# Patient Record
Sex: MALE | Race: WHITE | NOT HISPANIC OR LATINO | Employment: FULL TIME | URBAN - METROPOLITAN AREA
[De-identification: names, ages, dates, MRNs, and addresses within clinical notes are randomized per-mention and may not be internally consistent; named-entity substitution may affect disease eponyms.]

---

## 2021-11-23 ENCOUNTER — TELEPHONE (OUTPATIENT)
Dept: SCHEDULING | Facility: IMAGING CENTER | Age: 46
End: 2021-11-23

## 2021-12-28 ENCOUNTER — OFFICE VISIT (OUTPATIENT)
Dept: MEDICAL GROUP | Facility: MEDICAL CENTER | Age: 46
End: 2021-12-28
Payer: COMMERCIAL

## 2021-12-28 VITALS
HEIGHT: 72 IN | BODY MASS INDEX: 31.15 KG/M2 | RESPIRATION RATE: 12 BRPM | DIASTOLIC BLOOD PRESSURE: 70 MMHG | TEMPERATURE: 96.7 F | WEIGHT: 230 LBS | OXYGEN SATURATION: 95 % | HEART RATE: 92 BPM | SYSTOLIC BLOOD PRESSURE: 110 MMHG

## 2021-12-28 DIAGNOSIS — Z12.11 COLON CANCER SCREENING: ICD-10-CM

## 2021-12-28 DIAGNOSIS — R20.0 FINGER NUMBNESS: ICD-10-CM

## 2021-12-28 DIAGNOSIS — Z23 NEED FOR VACCINATION: ICD-10-CM

## 2021-12-28 DIAGNOSIS — M54.9 CHRONIC BACK PAIN, UNSPECIFIED BACK LOCATION, UNSPECIFIED BACK PAIN LATERALITY: ICD-10-CM

## 2021-12-28 DIAGNOSIS — G89.29 CHRONIC BACK PAIN, UNSPECIFIED BACK LOCATION, UNSPECIFIED BACK PAIN LATERALITY: ICD-10-CM

## 2021-12-28 DIAGNOSIS — Z00.00 HEALTHCARE MAINTENANCE: ICD-10-CM

## 2021-12-28 PROCEDURE — 99386 PREV VISIT NEW AGE 40-64: CPT | Mod: 25 | Performed by: FAMILY MEDICINE

## 2021-12-28 PROCEDURE — 90472 IMMUNIZATION ADMIN EACH ADD: CPT | Performed by: FAMILY MEDICINE

## 2021-12-28 PROCEDURE — 90471 IMMUNIZATION ADMIN: CPT | Performed by: FAMILY MEDICINE

## 2021-12-28 PROCEDURE — 90715 TDAP VACCINE 7 YRS/> IM: CPT | Performed by: FAMILY MEDICINE

## 2021-12-28 PROCEDURE — 90686 IIV4 VACC NO PRSV 0.5 ML IM: CPT | Performed by: FAMILY MEDICINE

## 2021-12-28 RX ORDER — ASPIRIN 81 MG/1
81 TABLET, CHEWABLE ORAL DAILY
COMMUNITY
End: 2022-02-09

## 2021-12-28 ASSESSMENT — PATIENT HEALTH QUESTIONNAIRE - PHQ9: CLINICAL INTERPRETATION OF PHQ2 SCORE: 0

## 2021-12-28 NOTE — ASSESSMENT & PLAN NOTE
Lipids: ordered.  Fasting Glucose: ordered.  Colonoscopy: declines, assents to FIT.   PSA: ordered.    Flu vaccine: given 12/28/21.   Tdap: given 12/28/21.   COVID vaccine: reportedly received.

## 2021-12-28 NOTE — PROGRESS NOTES
[facility]  Progress Note  New Patient    Subjective:   Avery Jhaveri is a 46 y.o. male here today for a wellness visit. This is a new patient to me. The patient comes in alone.     Healthcare maintenance  Lipids: ordered.  Fasting Glucose: ordered.  Colonoscopy: declines, assents to FIT.   PSA: ordered.    Flu vaccine: given 12/28/21.   Tdap: given 12/28/21.   COVID vaccine: reportedly received.    Back pain  Two years ago the patient slipped and injured his back.  He was told that he has vertebral compression fractures.  He currently treats these with ibuprofen 1 tablet twice daily.  He sees a chiropractor.  He does describe some finger numbness, discussed elsewhere.  He recently moved here and would be interested in establishing with a physiatrist here.    Finger numbness  Patient describes a few years of intermittent numbness in the first 3 digits of the bilateral hands.      Current Outpatient Medications on File Prior to Visit   Medication Sig Dispense Refill   • aspirin (ASA) 81 MG Chew Tab chewable tablet Chew 81 mg every day.       No current facility-administered medications on file prior to visit.       Past Medical History:   Diagnosis Date   • Back pain        Allergies: Patient has no known allergies.    Surgical History:  has a past surgical history that includes ankle total (Left, 1995).    Family History: family history includes Heart Disease in his father.    Social History:  reports that he has never smoked. He has never used smokeless tobacco. He reports current alcohol use. He reports that he does not use drugs.    ROS:   No CP or SOB.        Objective:     Vitals:    12/28/21 0754   BP: 110/70   BP Location: Left arm   Patient Position: Sitting   BP Cuff Size: Adult   Pulse: 92   Resp: 12   Temp: 35.9 °C (96.7 °F)   TempSrc: Temporal   SpO2: 95%   Weight: 104 kg (230 lb)   Height: 1.829 m (6')       Physical Exam:  Constitutional: Alert, no distress.  Skin: Warm, dry, good turgor, no rashes  in visible areas.  Eye: Equal, round and reactive, conjunctiva clear, lids normal.  Respiratory: Unlabored respiratory effort, lungs clear to auscultation, no wheezes, no ronchi.  Cardiovascular: Normal S1, S2, no murmur, no edema.  Ext: Negative Spurling's testing bilaterally.  Sensation intact in all dermatomal distributions the bilateral upper extremities.  Radial pulses 2+ bilaterally.  Strength 5 out of 5 on bilateral handgrip.      Assessment and Plan:     1. Need for vaccination  - Tdap Vaccine =>6YO IM  - INFLUENZA VACCINE QUAD INJ (PF)    2. Healthcare maintenance  - see HPI.   -Age-appropriate anticipatory guidance discussed including diet and exercise.  Recommended 3 fruits, 4 vegetables and half a gallon of water each day.  Recommended 30 minutes of exercise each day.  -Immunizations given today.   - CBC WITH DIFFERENTIAL; Future  - Comp Metabolic Panel; Future  - Lipid Profile; Future  - PROSTATE SPECIFIC AG SCREENING; Future  - OCCULT BLOOD FECES IMMUNOASSAY; Future    3. Chronic back pain, unspecified back location, unspecified back pain laterality  - Referral to Pain Management - Chronic Opioid Therapy    4. Finger numbness  I suspect this is due to carpal tunnel syndrome and recommended a nighttime wrist splint, however, with his history of back injury I think a physiatry consultation is warranted.  - Referral to Pain Management - Chronic Opioid Therapy    5. Colon cancer screening  - OCCULT BLOOD FECES IMMUNOASSAY; Future        Followup: Return in about 1 year (around 12/28/2022), or if symptoms worsen or fail to improve, for Wellness Visit, Long.

## 2021-12-28 NOTE — LETTER
[facility]  Timothy Butterfield M.D.  [address]  [place] [address]  Fax: [phone]   Authorization for Release/Disclosure of   Protected Health Information   Name: BIN AN : 1975 SSN: [id number]   Address: [address] [place] [address] [place] [address] Phone:    [phone] (home)    I authorize the entity listed below to release/disclose the PHI below to:   [facility]/Timothy Butterfield M.D. and Timothy Butterfield M.D.   Provider or Entity Name:     Address   City, State, Zip   Phone:      Fax:     Reason for request: continuity of care   Information to be released:    [  ] LAST COLONOSCOPY,  including any PATH REPORT and follow-up  [  ] LAST FIT/COLOGUARD RESULT [  ] LAST DEXA  [  ] LAST MAMMOGRAM  [  ] LAST PAP  [  ] LAST LABS [  ] RETINA EXAM REPORT  [  ] IMMUNIZATION RECORDS  [  ] Release all info      [  ] Check here and initial the line next to each item to release ALL health information INCLUDING  _____ Care and treatment for drug and / or alcohol abuse  _____ HIV testing, infection status, or AIDS  _____ Genetic Testing    DATES OF SERVICE OR TIME PERIOD TO BE DISCLOSED: _____________  I understand and acknowledge that:  * This Authorization may be revoked at any time by you in writing, except if your health information has already been used or disclosed.  * Your health information that will be used or disclosed as a result of you signing this authorization could be re-disclosed by the recipient. If this occurs, your re-disclosed health information may no longer be protected by State or Federal laws.  * You may refuse to sign this Authorization. Your refusal will not affect your ability to obtain treatment.  * This Authorization becomes effective upon signing and will  on (date) __________.      If no date is indicated, this Authorization will  one (1) year from the signature date.    Name: Avery Jhaveri    Signature:   Date:     2021       PLEASE FAX REQUESTED RECORDS BACK TO:  [phone]

## 2021-12-28 NOTE — ASSESSMENT & PLAN NOTE
Two years ago the patient slipped and injured his back.  He was told that he has vertebral compression fractures.  He currently treats these with ibuprofen 1 tablet twice daily.  He sees a chiropractor.  He does describe some finger numbness, discussed elsewhere.  He recently moved here and would be interested in establishing with a physiatrist here.

## 2021-12-28 NOTE — ASSESSMENT & PLAN NOTE
Patient describes a few years of intermittent numbness in the first 3 digits of the bilateral hands.

## 2022-01-12 ENCOUNTER — HOSPITAL ENCOUNTER (OUTPATIENT)
Dept: LAB | Facility: MEDICAL CENTER | Age: 47
End: 2022-01-12
Attending: FAMILY MEDICINE
Payer: COMMERCIAL

## 2022-01-12 DIAGNOSIS — Z00.00 HEALTHCARE MAINTENANCE: ICD-10-CM

## 2022-01-12 LAB
ALBUMIN SERPL BCP-MCNC: 4.5 G/DL (ref 3.2–4.9)
ALBUMIN/GLOB SERPL: 1.5 G/DL
ALP SERPL-CCNC: 61 U/L (ref 30–99)
ALT SERPL-CCNC: 16 U/L (ref 2–50)
ANION GAP SERPL CALC-SCNC: 11 MMOL/L (ref 7–16)
AST SERPL-CCNC: 17 U/L (ref 12–45)
BASOPHILS # BLD AUTO: 0.8 % (ref 0–1.8)
BASOPHILS # BLD: 0.04 K/UL (ref 0–0.12)
BILIRUB SERPL-MCNC: 0.4 MG/DL (ref 0.1–1.5)
BUN SERPL-MCNC: 19 MG/DL (ref 8–22)
CALCIUM SERPL-MCNC: 9.3 MG/DL (ref 8.5–10.5)
CHLORIDE SERPL-SCNC: 103 MMOL/L (ref 96–112)
CHOLEST SERPL-MCNC: 183 MG/DL (ref 100–199)
CO2 SERPL-SCNC: 24 MMOL/L (ref 20–33)
CREAT SERPL-MCNC: 0.85 MG/DL (ref 0.5–1.4)
EOSINOPHIL # BLD AUTO: 0.15 K/UL (ref 0–0.51)
EOSINOPHIL NFR BLD: 3.1 % (ref 0–6.9)
ERYTHROCYTE [DISTWIDTH] IN BLOOD BY AUTOMATED COUNT: 41.6 FL (ref 35.9–50)
FASTING STATUS PATIENT QL REPORTED: NORMAL
GLOBULIN SER CALC-MCNC: 3.1 G/DL (ref 1.9–3.5)
GLUCOSE SERPL-MCNC: 93 MG/DL (ref 65–99)
HCT VFR BLD AUTO: 47.9 % (ref 42–52)
HDLC SERPL-MCNC: 39 MG/DL
HGB BLD-MCNC: 16.5 G/DL (ref 14–18)
IMM GRANULOCYTES # BLD AUTO: 0.03 K/UL (ref 0–0.11)
IMM GRANULOCYTES NFR BLD AUTO: 0.6 % (ref 0–0.9)
LDLC SERPL CALC-MCNC: 104 MG/DL
LYMPHOCYTES # BLD AUTO: 1.93 K/UL (ref 1–4.8)
LYMPHOCYTES NFR BLD: 39.3 % (ref 22–41)
MCH RBC QN AUTO: 31.1 PG (ref 27–33)
MCHC RBC AUTO-ENTMCNC: 34.4 G/DL (ref 33.7–35.3)
MCV RBC AUTO: 90.4 FL (ref 81.4–97.8)
MONOCYTES # BLD AUTO: 0.32 K/UL (ref 0–0.85)
MONOCYTES NFR BLD AUTO: 6.5 % (ref 0–13.4)
NEUTROPHILS # BLD AUTO: 2.44 K/UL (ref 1.82–7.42)
NEUTROPHILS NFR BLD: 49.7 % (ref 44–72)
NRBC # BLD AUTO: 0 K/UL
NRBC BLD-RTO: 0 /100 WBC
PLATELET # BLD AUTO: 276 K/UL (ref 164–446)
PMV BLD AUTO: 9.6 FL (ref 9–12.9)
POTASSIUM SERPL-SCNC: 4.8 MMOL/L (ref 3.6–5.5)
PROT SERPL-MCNC: 7.6 G/DL (ref 6–8.2)
PSA SERPL-MCNC: 0.68 NG/ML (ref 0–4)
RBC # BLD AUTO: 5.3 M/UL (ref 4.7–6.1)
SODIUM SERPL-SCNC: 138 MMOL/L (ref 135–145)
TRIGL SERPL-MCNC: 198 MG/DL (ref 0–149)
WBC # BLD AUTO: 4.9 K/UL (ref 4.8–10.8)

## 2022-01-12 PROCEDURE — 85025 COMPLETE CBC W/AUTO DIFF WBC: CPT

## 2022-01-12 PROCEDURE — 36415 COLL VENOUS BLD VENIPUNCTURE: CPT

## 2022-01-12 PROCEDURE — 84153 ASSAY OF PSA TOTAL: CPT

## 2022-01-12 PROCEDURE — 80053 COMPREHEN METABOLIC PANEL: CPT

## 2022-01-12 PROCEDURE — 80061 LIPID PANEL: CPT

## 2022-02-09 ENCOUNTER — OFFICE VISIT (OUTPATIENT)
Dept: PHYSICAL MEDICINE AND REHAB | Facility: MEDICAL CENTER | Age: 47
End: 2022-02-09
Payer: COMMERCIAL

## 2022-02-09 VITALS
RESPIRATION RATE: 15 BRPM | WEIGHT: 227.74 LBS | SYSTOLIC BLOOD PRESSURE: 144 MMHG | BODY MASS INDEX: 30.85 KG/M2 | OXYGEN SATURATION: 95 % | HEART RATE: 95 BPM | HEIGHT: 72 IN | TEMPERATURE: 97.7 F | DIASTOLIC BLOOD PRESSURE: 62 MMHG

## 2022-02-09 DIAGNOSIS — G89.29 CHRONIC BILATERAL LOW BACK PAIN WITHOUT SCIATICA: ICD-10-CM

## 2022-02-09 DIAGNOSIS — M54.50 CHRONIC BILATERAL LOW BACK PAIN WITHOUT SCIATICA: ICD-10-CM

## 2022-02-09 DIAGNOSIS — Z87.81 HISTORY OF CERVICAL FRACTURE: ICD-10-CM

## 2022-02-09 DIAGNOSIS — G89.29 CHRONIC NECK PAIN: ICD-10-CM

## 2022-02-09 DIAGNOSIS — G56.21 ULNAR NEUROPATHY OF RIGHT UPPER EXTREMITY: ICD-10-CM

## 2022-02-09 DIAGNOSIS — M54.2 CHRONIC NECK PAIN: ICD-10-CM

## 2022-02-09 DIAGNOSIS — R20.0 RIGHT ARM NUMBNESS: ICD-10-CM

## 2022-02-09 PROCEDURE — 99204 OFFICE O/P NEW MOD 45 MIN: CPT | Performed by: PHYSICAL MEDICINE & REHABILITATION

## 2022-02-09 RX ORDER — MELOXICAM 15 MG/1
15 TABLET ORAL
Qty: 60 TABLET | Refills: 0 | Status: SHIPPED | OUTPATIENT
Start: 2022-02-09

## 2022-02-09 ASSESSMENT — PATIENT HEALTH QUESTIONNAIRE - PHQ9
CLINICAL INTERPRETATION OF PHQ2 SCORE: 2
5. POOR APPETITE OR OVEREATING: 0 - NOT AT ALL
SUM OF ALL RESPONSES TO PHQ QUESTIONS 1-9: 7

## 2022-02-09 ASSESSMENT — FIBROSIS 4 INDEX: FIB4 SCORE: .7391304347826086957

## 2022-02-09 NOTE — PROGRESS NOTES
New patient note    Interventional spine and Pain  Physiatry (physical medicine and  Rehabilitation)     Date of service: See epic    Chief complaint:   Chief Complaint   Patient presents with   • New Patient     Back Pain/Finger Numbness        Referring provider: Timothy Butterfield M.D.     HISTORY    HPI: Avery Jhaveri 46 y.o.  who presents today with Diagnoses of Chronic neck pain, History of cervical fracture, Right arm numbness, Ulnar neuropathy of right upper extremity, and Chronic bilateral low back pain without sciatica were pertinent to this visit.    HPI    The patient reported no back pain previously. He then had a fall when he slipped on marble. He reported having fractures of his cervical spine. This was in November 2019. 7-10/10 in intensity and associated with periscapular pain and numbness. Constant.     He also has chronic bilateral low back pain over the past year. This is gradually worsening. Nonradiating, aching in quality. Intermittent.     He has tried chiropractors, physical therapy, home exercise program. 7/10    Medications tried include nsaids, tylenol, opioids which he wants to avoid. He wants to avoid muscle relaxers and medications with cognitive side effects because he takes care of his daughter at home.       Medical records review:  I reviewed the note from the referring provider Timothy Butterfield M.D. including the note dated 12/28/2021.           ROS:   Red Flags ROS:   Fever, Chills, Sweats: Denies  Involuntary Weight Loss: Denies  Bladder Incontinence: Denies  Bowel Incontinence: denies  Saddle Anesthesia: Denies    All other systems reviewed and negative.       PMHx:   Past Medical History:   Diagnosis Date   • Back pain          Current Outpatient Medications on File Prior to Visit   Medication Sig Dispense Refill   • Multiple Vitamins-Minerals (MULTIVITAMIN ADULTS PO) Take  by mouth.       No current facility-administered medications on file prior to visit.        PSHx:   Past  Surgical History:   Procedure Laterality Date   • ANKLE TOTAL Left 1995    twice       Family history   Family History   Problem Relation Age of Onset   • Heart Disease Father          Medications: reviewed on epic.   Outpatient Medications Marked as Taking for the 2/9/22 encounter (Office Visit) with Ti Cordoba M.D.   Medication Sig Dispense Refill   • Multiple Vitamins-Minerals (MULTIVITAMIN ADULTS PO) Take  by mouth.     • meloxicam (MOBIC) 15 MG tablet Take 1 Tablet by mouth 1 time a day as needed (pain). Do not take other NSAIDs. No refills. 60 Tablet 0        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[employer]   Other Topics Concern   • Not on file   Social History Narrative   • Not on file     Social Determinants of Health     Financial Resource Strain:    • Difficulty of Paying Living Expenses: Not on file   Food Insecurity:    • Worried About Running Out of Food in the Last Year: Not on file   • Ran Out of Food in the Last Year: Not on file   Transportation Needs:    • Lack of Transportation (Medical): Not on file   • Lack of Transportation (Non-Medical): Not on file   Physical Activity:    • Days of Exercise per Week: Not on file   • Minutes of Exercise per Session: Not on file   Stress:    • Feeling of Stress : Not on file   Social Connections:    • Frequency of Communication with Friends and Family: Not on file   • Frequency of Social Gatherings with Friends and Family:  Not on file   • Attends Bahai Services: Not on file   • Active Member of Clubs or Organizations: Not on file   • Attends Club or Organization Meetings: Not on file   • Marital Status: Not on file   Intimate Partner Violence:    • Fear of Current or Ex-Partner: Not on file   • Emotionally Abused: Not on file   • Physically Abused: Not on file   • Sexually Abused: Not on file   Housing Stability:    • Unable to Pay for Housing in the Last Year: Not on file   • Number of Places Lived in the Last Year: Not on file   • Unstable Housing in the Last Year: Not on file         EXAMINATION     Physical Exam:   Vitals: /62 (BP Location: Left arm, Patient Position: Sitting, BP Cuff Size: Adult)   Pulse 95   Temp 36.5 °C (97.7 °F) (Temporal)   Resp 15   Ht 1.829 m (6')   Wt 103 kg (227 lb 11.8 oz)   SpO2 95%     Constitutional:   Body Habitus: Body mass index is 30.89 kg/m².  Cooperation: Fully cooperates with exam  Appearance: Well-groomed, well-nourished, not disheveled     Eyes: No scleral icterus to suggest severe liver disease, no proptosis to suggest severe hyperthyroid    ENT -no obvious auditory deficits, no obvious tongue lesions, tongue midline, no facial droop     Skin -no rashes or lesions noted     Respiratory-  breathing comfortable on room air, no audible wheezing    Cardiovascular- capillary refills less than 2 seconds.     Psychiatric- alert and oriented ×3. Normal affect.     Gait - normal gait, no use of ambulatory device, nonantalgic. the patient can toe walk with ease. the patient can heel walk with ease. the patient can tandem walk with ease. balance is appropriate..     Musculoskeletal and Neuro -     Bilateral hands:   Inspection: No swelling,  Deformities or rashes. Symmetric appearing thenar and hyperthenar regions bilaterally.  Palpation no significant tenderness to palpation throughout the bilateral hands  Range of motion is within normal limits throughout bilateral hands, fingers and  wrist.  Special tests:  Tinel's at the wrist over the median nerve negative bilaterally  Carpal tunnel compression: negative bilaterally  Phalen's test: negative bilaterally  Finkelstein's test: negative bilaterally  CMC grind test negative bilaterally    right and left elbow exam  Inspection: No rashes, swelling or deformities. No swelling around the olecranon.  Palpation:  No tenderness palpation to the lateral epicondyle, medial epicondyle, olecranon, cubital fossa, common flexor tendon.  Range of motion: Normal in the elbow in flexion and extension.  Special tests:  Resisted wrist extension (Cozens test): negative  Resisted wrist flexion: Negative  Tinel's is positive at the right elbow and negative on the left      Cervical spine   Inspection: No deformities of the skin over the cervical spine. No rashes or lesions.    decreased  active range of motion in all directions, with  pain      Spurling’s sign: negative bilaterally    No signs of muscular atrophy in bilateral upper extremities     No tenderness to palpation of the cervical spine      Key points for the international standards for neurological classification of spinal cord injury (ISNCSCI) to light touch.     Dermatome R L   C4 2 2   C5 2 2   C6 2 2   C7 2 2   C8 2 2   T1 2 2   T2 2 2                                     Motor Exam Upper Extremities   ? Myotome R L   Shoulder flexion C5 5 5   Elbow flexion C5 5 5   Wrist extension C6 5 5   Elbow extension C7 5 5   Finger flexion C8 5 5   Finger abduction T1 5 5     Reflexes  ?  R L   Biceps  2+ 2+   Brachioradialis  2+ 2+     Wilks’s sign negative bilaterally            Thoracic/Lumbar Spine/Sacral Spine/Hips   Inspection: No evidence of atrophy in bilateral lower extremities throughout     ROM: full  active range of motion with flexion, lateral flexion, and rotation bilaterally.   There is decreased active range of motion with lumbar extension with pain.    There is pain with facet loading maneuver  (extension rotation) with axial low back pain on the BILATERAL side(s)    Palpation:   No tenderness to palpation in midline at T1-T12 levels. No tenderness to palpation in the left and right of the midline T1-L5, NEGATIVE for tenderness to palpation to the para-midline region in the lower lumbar levels.  palpation over SI joint: negative bilaterally    palpation in hip or over the gluteus medius tendon insertion: negative bilaterally      Lumbar spine Special tests  Neuro tension  Straight leg test negative bilaterally    Slump test negative bilaterally      HIP  FAIR test negative bilaterally    Range of motion in the RIGHT hip is full  in flexion, extension, abduction, internal rotation, external rotation.  Range of motion in the LEFT hip is full  in flexion, extension, abduction, internal rotation, external rotation.      SI joint tests  Observation patient sits on one buttocks: Negative  SI joint compression negative bilaterally    SI joint distraction negative bilaterally    Thigh thrust test negative bilaterally    ALO test negative bilaterally                 Key points for the international standards for neurological classification of spinal cord injury (ISNCSCI) to light touch.     Dermatome R L                                      L2 2 2   L3 2 2   L4 2 2   L5 2 2   S1 2 2   S2 2 2       Motor Exam Lower Extremities    ? Myotome R L   Hip flexion L2 5 5   Knee extension L3 5 5   Ankle dorsiflexion L4 5 5   Toe extension L5 5 5   Ankle plantarflexion S1 5 5         Reflexes  ?  R L                Patella  2+ 2+   Achilles   2+ 2+       Babinski sign negative bilaterally   Clonus of the ankle negative bilaterally       MEDICAL DECISION MAKING    Medical records review: see under HPI section.     DATA    Labs:   Lab Results   Component Value Date/Time    SODIUM 138 01/12/2022 09:14 AM    POTASSIUM 4.8 01/12/2022 09:14 AM    CHLORIDE 103 01/12/2022 09:14 AM    CO2 24 01/12/2022 09:14 AM    ANION 11.0  01/12/2022 09:14 AM    GLUCOSE 93 01/12/2022 09:14 AM    BUN 19 01/12/2022 09:14 AM    CREATININE 0.85 01/12/2022 09:14 AM    CALCIUM 9.3 01/12/2022 09:14 AM    ASTSGOT 17 01/12/2022 09:14 AM    ALTSGPT 16 01/12/2022 09:14 AM    TBILIRUBIN 0.4 01/12/2022 09:14 AM    ALBUMIN 4.5 01/12/2022 09:14 AM    TOTPROTEIN 7.6 01/12/2022 09:14 AM    GLOBULIN 3.1 01/12/2022 09:14 AM    AGRATIO 1.5 01/12/2022 09:14 AM   ]    No results found for: PROTHROMBTM, INR     Lab Results   Component Value Date/Time    WBC 4.9 01/12/2022 09:14 AM    RBC 5.30 01/12/2022 09:14 AM    HEMOGLOBIN 16.5 01/12/2022 09:14 AM    HEMATOCRIT 47.9 01/12/2022 09:14 AM    MCV 90.4 01/12/2022 09:14 AM    MCH 31.1 01/12/2022 09:14 AM    MCHC 34.4 01/12/2022 09:14 AM    MPV 9.6 01/12/2022 09:14 AM    NEUTSPOLYS 49.70 01/12/2022 09:14 AM    LYMPHOCYTES 39.30 01/12/2022 09:14 AM    MONOCYTES 6.50 01/12/2022 09:14 AM    EOSINOPHILS 3.10 01/12/2022 09:14 AM    BASOPHILS 0.80 01/12/2022 09:14 AM        No results found for: HBA1C     Imaging:   I personally reviewed following images, these are my reads          IMAGING radiology reads. I reviewed the following radiology reads                                                                                                               Diagnosis   Visit Diagnoses     ICD-10-CM   1. Chronic neck pain  M54.2    G89.29   2. History of cervical fracture  Z87.81   3. Right arm numbness  R20.0   4. Ulnar neuropathy of right upper extremity  G56.21   5. Chronic bilateral low back pain without sciatica  M54.50    G89.29           ASSESSMENT AND PLAN:  Avery Jhaveri 46 y.o. male      Avery was seen today for new patient.    Diagnoses and all orders for this visit:    Chronic neck pain  -     DX-CERVICAL SPINE-2 OR 3 VIEWS; Future  -     MR-CERVICAL SPINE-W/O; Future  -     Referral to Neurodiagnostics (EEG,EP,EMG/NCS/DBS)  -     Referral to Physical Therapy  -     meloxicam (MOBIC) 15 MG tablet; Take 1 Tablet by mouth 1 time a day  as needed (pain). Do not take other NSAIDs. No refills.    History of cervical fracture  -     DX-CERVICAL SPINE-2 OR 3 VIEWS; Future  -     MR-CERVICAL SPINE-W/O; Future  -     Referral to Neurodiagnostics (EEG,EP,EMG/NCS/DBS)  -     Referral to Physical Therapy  -     meloxicam (MOBIC) 15 MG tablet; Take 1 Tablet by mouth 1 time a day as needed (pain). Do not take other NSAIDs. No refills.    Right arm numbness  -     Referral to Neurodiagnostics (EEG,EP,EMG/NCS/DBS)  -     Referral to Physical Therapy  -     meloxicam (MOBIC) 15 MG tablet; Take 1 Tablet by mouth 1 time a day as needed (pain). Do not take other NSAIDs. No refills.    Ulnar neuropathy of right upper extremity  -     Referral to Neurodiagnostics (EEG,EP,EMG/NCS/DBS)  -     Referral to Physical Therapy  -     meloxicam (MOBIC) 15 MG tablet; Take 1 Tablet by mouth 1 time a day as needed (pain). Do not take other NSAIDs. No refills.    Chronic bilateral low back pain without sciatica  -     DX-LUMBAR SPINE-2 OR 3 VIEWS; Future  -     Referral to Physical Therapy  -     meloxicam (MOBIC) 15 MG tablet; Take 1 Tablet by mouth 1 time a day as needed (pain). Do not take other NSAIDs. No refills.      The patient is failed multiple conservative treatments described above.  Given his history of fracture as well as chronic neck pain I believe new imaging is reasonable including an x-ray and MRI of the cervical spine.  He also has pain numbness tingling and some weakness in an ulnar distribution which is difficult to separate from his neck pain.  I believe an EMG would be helpful for this distinction.    For the patient's low back pain we we will start with an x-ray.  He likely has some facet mediated pain.      Physical therapy: I ordered physical therapy to focus on strengthening and stretching.     home exercise program: I provided the patient with a strengthening and stretching with a home exercise program     Diagnostic workup: As above    Medications:    As above     Interventional program: I would consider the patient for an epidural steroid injection depending on the results of the above       Outside records requested:  The patient signed outside records request form for his outside records including outside images. This includes the records from [place]      Follow-up: After the above diagnostic studies           Please note that this dictation was created using voice recognition software. I have made every reasonable attempt to correct obvious errors but there may be errors of grammar and content that I may have overlooked prior to finalization of this note.      Ti Cordoba MD  Physical Medicine and Rehabilitation  Interventional Spine and Sports Physiatry  [facility]           Timothy Pittman M.D.

## 2022-02-16 ENCOUNTER — HOSPITAL ENCOUNTER (OUTPATIENT)
Dept: RADIOLOGY | Facility: MEDICAL CENTER | Age: 47
End: 2022-02-16
Attending: PHYSICAL MEDICINE & REHABILITATION
Payer: COMMERCIAL

## 2022-02-16 DIAGNOSIS — Z87.81 HISTORY OF CERVICAL FRACTURE: ICD-10-CM

## 2022-02-16 DIAGNOSIS — G89.29 CHRONIC NECK PAIN: ICD-10-CM

## 2022-02-16 DIAGNOSIS — M54.2 CHRONIC NECK PAIN: ICD-10-CM

## 2022-02-16 DIAGNOSIS — M54.50 CHRONIC BILATERAL LOW BACK PAIN WITHOUT SCIATICA: ICD-10-CM

## 2022-02-16 DIAGNOSIS — G89.29 CHRONIC BILATERAL LOW BACK PAIN WITHOUT SCIATICA: ICD-10-CM

## 2022-02-16 PROCEDURE — 72100 X-RAY EXAM L-S SPINE 2/3 VWS: CPT

## 2022-02-16 PROCEDURE — 72141 MRI NECK SPINE W/O DYE: CPT

## 2022-02-16 PROCEDURE — 72040 X-RAY EXAM NECK SPINE 2-3 VW: CPT

## 2022-02-25 ENCOUNTER — NON-PROVIDER VISIT (OUTPATIENT)
Dept: NEUROLOGY | Facility: MEDICAL CENTER | Age: 47
End: 2022-02-25
Attending: PSYCHIATRY & NEUROLOGY
Payer: COMMERCIAL

## 2022-02-25 DIAGNOSIS — R20.0 RIGHT ARM NUMBNESS: ICD-10-CM

## 2022-02-25 PROCEDURE — 95886 MUSC TEST DONE W/N TEST COMP: CPT | Mod: 26 | Performed by: SPECIALIST

## 2022-02-25 PROCEDURE — 95886 MUSC TEST DONE W/N TEST COMP: CPT | Performed by: SPECIALIST

## 2022-02-25 PROCEDURE — 95908 NRV CNDJ TST 3-4 STUDIES: CPT | Performed by: SPECIALIST

## 2022-02-25 PROCEDURE — 95908 NRV CNDJ TST 3-4 STUDIES: CPT | Mod: 26 | Performed by: SPECIALIST

## 2022-02-25 NOTE — PROCEDURES
NERVE CONDUCTION STUDIES AND ELECTROMYOGRAPHY REPORT        02/25/22      Referring provider: Timothy Butterfield M.D.      SUMMARY OF PATIENT'S CLINICAL HISTORY,PHYSICAL EXAM, AND RATIONALE FOR TESTING:    Mr. Varela An 46 y.o. 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    Past Medical History is significant for :   Past Medical History:   Diagnosis Date   • Back pain        The electrodiagnostic studies were performed to evaluate for possible radiculopathy versus peripheral neuropathy.      ELECTRODIAGNOSTIC EXAMINATION:  Nerve conduction studies (NCS) and electromyography (EMG) are utilized to evaluate direct or indirect damage to the peripheral nervous system. NCS are performed to measure the nerve(s) response(s) to electrostimulation across a given nerve segment. EMG evaluates the passive and active electrical activity of the muscle(s) in question.  Muscles are innervated by specific peripheral nerves and roots. Often times, several nerves the muscle to be examined in order to determine the presence or absence of the disease process. Furthermore, nerves and muscles may need to be tested in a [id number] comparison, as well as in additional extremities, as this may be crucial in characterizing the extent of the disease process, which may be diffuse or isolated and of varying degree of severity. The extent of the neurodiagnostic exam is justified as it may help arrive to a proper diagnosis, which ultimately may contribute to better management of the patient. Therefore, the nerves to muscles examined during the study were medically necessary.    Unless otherwise noted, temperature of the extremity(s) study was monitored before and during the examination and remained between 32 and 36 degrees C for the upper extremities, and between 30 and 36 degrees C for the lower extremities.      NERVE CONDUCTION STUDIES:  Sensory nerves:   -Right median sensory nerve  examined.The response was within normal limits.  -Right ulnar sensory nerve was examined.  The response was within normal limits.    Motor nerves:  -Right median motor nerve was examined with the recording electrode placed at the abductor pollicis brevis muscle.  The response was within normal limits.  -Right ulnar motor nerve was examined with the recording electrode placed at the abductor digiti minimi muscle.  The response was abnormal.  Onset latency and amplitude were within normal limits.  Conduction velocity slowed mildly across the elbow.    LATE RESPONSES:  F waves were obtained and the following nerves: Right ulnar.  The responses within normal limits for the patient's age.        ELECTROMYOGRAPHY:  The study was performed the concentric needle electrode. Fibrillation and fasciculation activity is graded by convention from none (0) to continuous (4+).  Needle electrode examination was performed in the following muscles: Right deltoid, biceps, triceps, first dorsal interosseous, abductor pollicis brevis.  The muscles tested demonstrated normal insertional activity, normal motor unit morphology and recruitment. There were no elements suggestive of active denervation.      Nerve Conduction Studies     Stim Site NR Onset (ms) Norm Onset (ms) O-P Amp (µV) Norm O-P Amp Site1 Site2 Delta-P (ms) Dist (cm) Scott (m/s) Norm Scott (m/s)   Right Median Anti Sensory (2nd Digit)   Wrist    2.3 <3.4 39.0 >20 Wrist 2nd Digit 2.9 14.0 *48 >50   Right Ulnar Anti Sensory (5th Digit)   Wrist    2.2 <3.1 24.6 >12 Wrist 5th Digit 2.9 14.0 *48 >50        Stim Site NR Onset (ms) Norm Onset (ms) O-P Amp (mV) Norm O-P Amp Site1 Site2 Delta-0 (ms) Dist (cm) Scott (m/s) Norm Scott (m/s)   Right Median Motor (Abd Poll Brev)   Wrist    2.7 <3.9 13.8 >6 Elbow Wrist 4.6 28.0 61 >50   Elbow    7.3  13.5          Right Ulnar Motor (Abd Dig Min)   Wrist    2.4 <3.1 15.2 >7 B Elbow Wrist 3.5 21.0 60 >50   B Elbow    5.9  14.1  A Elbow B Elbow 1.7  10.0 59    A Elbow    7.6  13.5            F Wave Studies     NR F-Lat (ms) Lat Norm (ms)   Right Ulnar (Abd Dig Min)      26.88 <32                      Electromyography     Side Muscle Nerve Root Ins Act Fibs Psw Amp Dur Poly Recrt Int Pat Comment   Right Deltoid Axillary C5-6 Nml Nml Nml Nml Nml 0 Nml Nml    Right Biceps Musculocut C5-6 Nml Nml Nml Nml Nml 0 Nml Nml    Right Triceps Radial C6-7-8 Nml Nml Nml Nml Nml 0 Nml Nml    Right 1stDorInt Ulnar C8-T1 Nml Nml Nml Nml Nml 0 Nml Nml    Right Abd Poll Brev Median C8-T1 Nml Nml Nml Nml Nml 0 Nml Nml          DIAGNOSTIC INTERPRETATION:   Extensive electrodiagnostic studies were performed to the right upper extremity.  The results are as follows:    1.  Mild right ulnar nerve slowing across the elbow without motor unit changes in ulnar nerve supplied hand muscles.    2.  Normal right median motor and sensory nerve conduction studies.    3.  No evidence of radiculopathy in selected muscles studied right upper extremity.    CLINICAL DISCUSSION:   Although the patient's MRI showed foraminal stenosis in the right C7 nerve root there was no evidence of acute insertional activity in any of the muscle studied.      AKILA Samayoa M.D.

## 2022-03-07 ENCOUNTER — OFFICE VISIT (OUTPATIENT)
Dept: PHYSICAL MEDICINE AND REHAB | Facility: MEDICAL CENTER | Age: 47
End: 2022-03-07
Payer: COMMERCIAL

## 2022-03-07 VITALS
DIASTOLIC BLOOD PRESSURE: 78 MMHG | HEART RATE: 83 BPM | SYSTOLIC BLOOD PRESSURE: 120 MMHG | BODY MASS INDEX: 30.37 KG/M2 | WEIGHT: 224.21 LBS | OXYGEN SATURATION: 99 % | HEIGHT: 72 IN | TEMPERATURE: 96.8 F

## 2022-03-07 DIAGNOSIS — Z87.81 HISTORY OF CERVICAL FRACTURE: ICD-10-CM

## 2022-03-07 DIAGNOSIS — M54.50 CHRONIC BILATERAL LOW BACK PAIN WITHOUT SCIATICA: ICD-10-CM

## 2022-03-07 DIAGNOSIS — M54.12 CERVICAL RADICULOPATHY: ICD-10-CM

## 2022-03-07 DIAGNOSIS — G56.21 ULNAR NEUROPATHY OF RIGHT UPPER EXTREMITY: ICD-10-CM

## 2022-03-07 DIAGNOSIS — R20.0 RIGHT ARM NUMBNESS: ICD-10-CM

## 2022-03-07 DIAGNOSIS — G89.29 CHRONIC BILATERAL LOW BACK PAIN WITHOUT SCIATICA: ICD-10-CM

## 2022-03-07 PROCEDURE — 99214 OFFICE O/P EST MOD 30 MIN: CPT | Performed by: PHYSICAL MEDICINE & REHABILITATION

## 2022-03-07 ASSESSMENT — PAIN SCALES - GENERAL: PAINLEVEL: 8=MODERATE-SEVERE PAIN

## 2022-03-07 ASSESSMENT — PATIENT HEALTH QUESTIONNAIRE - PHQ9
5. POOR APPETITE OR OVEREATING: 1 - SEVERAL DAYS
CLINICAL INTERPRETATION OF PHQ2 SCORE: 2
SUM OF ALL RESPONSES TO PHQ QUESTIONS 1-9: 6

## 2022-03-07 ASSESSMENT — FIBROSIS 4 INDEX: FIB4 SCORE: .7391304347826086957

## 2022-03-07 NOTE — PATIENT INSTRUCTIONS
Your procedure will be at the [facility] special procedure suite.    [facility] [place] [address]       PRE-PROCEDURE INSTRUCTIONS  You may take your regular medications except:   · No Anti-inflammatories 5 days prior to your procedure. Anti-inflammatories include medicines such as  ibuprofen (Motrin, Advil), Excedrin, Naproxen (Aleve, Anaprox, Naprelan, Naprosyn), Celecoxib (Celebrex), Diclofenac (Voltaren-XR tab), and Meloxicam (Mobic).   · You can take the remainder of your pain medications as prescribed.   · If you are having a diagnostic procedure such as a medial branch block, do not use her pain meds on the day of the procedure  · No Glucophage or Metformin 24 hours before your procedure. You may resume next day after your procedure.  · Call the physiatry office if you are taking or prescribed anti-biotics within five days of procedure.  · Please ask provider if you are taking any new diabetes medication.  · CONTINUE TAKING BLOOD PRESSURE MEDICATIONS AS PRESCRIBED.  · Pain medications will not be prescribed on the procedure day. Procedural pain medication may be used by your provider   · Call your doctor's office performing the procedure if you have a fever, chills, rash or new illness prior to your procedure    Anticoagulation/antiplatelet medications  No Blood thinning medications such as Coumadin, Xarelto, aspirin or Plavix 5 days prior to procedure unless your doctor said to continue these medications. Call your doctor if a new medication is prescribed in this class.     Restrictions for eating before procedure:   · If you are getting procedural sedation, then do not eat to for 8 hours prior to procedure appointment time. Do not drink fluids for four hours prior to your procedure time.   · If you are not having procedural sedation, then Skip the meal prior to your procedure. If you have a morning procedure then skip breakfast. If you have an afternoon procedure then skip lunch.   · You  may drink clear liquids up to 2 hours prior to your procedure  · You must have a  the day of procedure to accompany you home.      POST PROCEDURE INSTRUCTIONS   · No heavy lifting, strenuous bending or strenuous exercise for 3 days after your procedure.  · No hot tubs, baths, swimming for 3 days after your procedure  · You can remove the bandage the day after the procedure.  · IF YOU RECEIVED A STEROID INJECTION. PLEASE NOTE THAT THERE MAY BE A DELAY FOR THE INJECTION TO START WORKING, THE DELAY MAY BE UP TO TWO WEEKS. IF YOU HAVE DIABETES, PLEASE NOTE THAT YOUR SUGAR LEVELS MAY BE ELEVATED FOR 1-2 DAYS AFTER A STEROID INJECTION.  THE STEROID MAY CAUSE TEMPORARY SYMPTOMS WHICH USUALLY RESOLVE ON THEIR OWN WITHIN 1 TO 2 DAYS INCLUDING FACIAL FLUSHING OR A FEELING OF WARMTH ON THE FACE, TEMPORARY INCREASES IN BLOOD SUGAR, INSOMNIA, INCREASED HUNGER  · IF YOU EXPERIENCE PROLONGED WEAKNESS LONGER THAN ONE DAY, BOWEL OR BLADDER INCONTINENCE THEN PLEASE CALL THE PHYSIATRY OFFICE.  · Your leg may feel heavy, weak and numb for up to 1-2 days. Be very careful walking.   ·  You may resume normal activities 3 days after procedure.

## 2022-03-07 NOTE — H&P (VIEW-ONLY)
Follow up patient note  Interventional spine and Pain  Physiatry (physical medicine and  Rehabilitation)       Chief complaint:   Chief Complaint   Patient presents with   • Follow-Up     Back pain          HISTORY    Please see new patient note by Dr Cordoba,  for more details.     HPI  Patient identification: ISIDRA Holland 1975,   With Diagnoses of Cervical radiculopathy, Ulnar neuropathy of right upper extremity, History of cervical fracture, Right arm numbness, and Chronic bilateral low back pain without sciatica were pertinent to this visit.       Patient continues to have severe pain in the right side of his neck rating down the right arm with associated numbness tingling burning sensation 8 out of 10 in intensity.  He has difficulty with ADLs including upper body dressing because of his pain.    Failed conservative treatments with provider driven home exercise program including the past 2 months.     Medications tried include Tylenol, NSAIDs.  The patient did not tolerate muscle relaxers in the past.    ROS Red Flags :   Fever, Chills, Sweats: Denies  Involuntary Weight Loss: Denies  Bowel/Bladder Incontinence: Denies  Saddle Anesthesia: Denies        PMHx:   Past Medical History:   Diagnosis Date   • Back pain        PSHx:   Past Surgical History:   Procedure Laterality Date   • ANKLE TOTAL Left     twice       Family history   Family History   Problem Relation Age of Onset   • Heart Disease Father          Medications:   Outpatient Medications Marked as Taking for the 3/7/22 encounter (Office Visit) with Ti Cordoba M.D.   Medication Sig Dispense Refill   • Multiple Vitamins-Minerals (MULTIVITAMIN ADULTS PO) Take  by mouth.     • meloxicam (MOBIC) 15 MG tablet Take 1 Tablet by mouth 1 time a day as needed (pain). Do not take other NSAIDs. No refills. 60 Tablet 0        Current Outpatient Medications on File Prior to Visit   Medication Sig Dispense Refill   • Multiple Vitamins-Minerals  (MULTIVITAMIN ADULTS PO) Take  by mouth.     • meloxicam (MOBIC) 15 MG tablet Take 1 Tablet by mouth 1 time a day as needed (pain). Do not take other NSAIDs. No refills. 60 Tablet 0     No current facility-administered medications on file prior to visit.         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[employer] [facility]   Other Topics Concern   • Not on file   Social History Narrative   • Not on file     Social Determinants of Health     Financial Resource Strain: Not on file   Food Insecurity: Not on file   Transportation Needs: Not on file   Physical Activity: Not on file   Stress: Not on file   Social Connections: Not on file   Intimate Partner Violence: Not on file   Housing Stability: Not on file         EXAMINATION     Physical Exam:   Vitals: /78 (BP Location: Right arm, Patient Position: Sitting, BP Cuff Size: Adult)   Pulse 83   Temp 36 °C (96.8 °F) (Temporal)   Ht 1.829 m (6')   Wt 102 kg (224 lb 3.3 oz)   SpO2 99%     Constitutional:   Body Habitus: Body mass index is 30.41 kg/m².  Cooperation: Fully cooperates with exam  Appearance: Well-groomed no disheveled    Respiratory-  breathing comfortable on room air, no audible wheezing  Cardiovascular- capillary refills less than 2 seconds. No lower extremity edema is noted.   Psychiatric- alert and oriented ×3. Normal affect.    MSK and Neuro: -  Cervical spine  decreased active range of motion with flexion, lateral flexion,  and rotation bilaterally.   There is decreased active range of motion with cervical extension.      Palpation:   Tenderness to palpation throughout the cervical spine: negative bilaterally        Cervical spine Special tests  Neuro tension  Spurling's maneuver positive right, negative left           Key points for the international standards for neurological classification of spinal cord injury (ISNCSCI) to light touch.     Dermatome R L   C4 2 2   C5 2 2   C6 2 2   C7 1 2   C8 1 2   T1 2 2   T2 2 2                                         Motor Exam Upper Extremities   ? Myotome R L   Shoulder flexion C5 5 5   Elbow flexion C5 5 5   Wrist extension C6 5 5   Elbow extension C7 5 5   Finger flexion C8 5 5   Finger abduction T1 5 5               MEDICAL DECISION MAKING    DATA    Labs:   Lab Results   Component Value Date/Time    SODIUM 138 01/12/2022 09:14 AM    POTASSIUM 4.8 01/12/2022 09:14 AM    CHLORIDE 103 01/12/2022 09:14 AM    CO2 24 01/12/2022 09:14 AM    GLUCOSE 93 01/12/2022 09:14 AM    BUN 19 01/12/2022 09:14 AM    CREATININE 0.85 01/12/2022 09:14 AM        No results found for: PROTHROMBTM, INR     Lab Results   Component Value Date/Time    WBC 4.9 01/12/2022 09:14 AM    RBC 5.30 01/12/2022 09:14 AM    HEMOGLOBIN 16.5 01/12/2022 09:14 AM    HEMATOCRIT 47.9 01/12/2022 09:14 AM    MCV 90.4 01/12/2022 09:14 AM    MCH 31.1 01/12/2022 09:14 AM    MCHC 34.4 01/12/2022 09:14 AM    MPV 9.6 01/12/2022 09:14 AM    NEUTSPOLYS 49.70 01/12/2022 09:14 AM    LYMPHOCYTES 39.30 01/12/2022 09:14 AM    MONOCYTES 6.50 01/12/2022 09:14 AM    EOSINOPHILS 3.10 01/12/2022 09:14 AM    BASOPHILS 0.80 01/12/2022 09:14 AM        No results found for: HBA1C       Imaging:   I personally reviewed following images            I reviewed the following radiology reports                     Results for orders placed during the hospital encounter of 02/16/22    MR-CERVICAL SPINE-W/O    Impression  1.  Degenerative disease as described  above.  2.  There are combinations of uncinate joint arthropathy and right foraminal disc protrusion at C6-7 causing severe right C7 neural foraminal stenosis. The C7 nerve root might have been impinged at the neural foramina.  3.  Degenerative disease as described above.                                                                                                   Results for orders placed during the hospital encounter of 22    DX-CERVICAL SPINE-2 OR 3 VIEWS    Impression  Mild degenerative changes of the mid to lower cervical spine                         Results for orders placed during the hospital encounter of 22    DX-LUMBAR SPINE-2 OR 3 VIEWS    Impression  Degenerative changes of the lower lumbar spine                         DIAGNOSIS   Visit Diagnoses     ICD-10-CM   1. Cervical radiculopathy  M54.12   2. Ulnar neuropathy of right upper extremity  G56.21   3. History of cervical fracture  Z87.81   4. Right arm numbness  R20.0   5. Chronic bilateral low back pain without sciatica  M54.50    G89.29         ASSESSMENT and PLAN:     Avery Jhaveri  1975 male      Avery was seen today for follow-up.    Diagnoses and all orders for this visit:    Cervical radiculopathy  -     Referral to Physical Medicine Rehab    Ulnar neuropathy of right upper extremity    History of cervical fracture    Right arm numbness    Chronic bilateral low back pain without sciatica      I reviewed the MRI cervical spine with the patient at today's visit.  This is consistent with her symptoms with a cervical radiculopathy.  He is failed conservative treatments described above.    I have ordered a C7-T1 interlaminar epidural steroid injection    The risks benefits and alternatives to this procedure were discussed and the patient wishes to proceed with the procedure. Risks include but are not limited to damage to surrounding structures, infection, bleeding, worsening of pain which can be permanent, weakness which can be  permanent. Benefits include pain relief, improved function. Alternatives includes not doing the procedure.      Stop meloxicam 5 days prior to procedure above      Follow up: After the above procedure    Thank you for allowing me to participate in the care of this patient. If you have any questions please not hesitate to contact me.             Please note that this dictation was created using voice recognition software. I have made every reasonable attempt to correct obvious errors but there may be errors of grammar and content that I may have overlooked prior to finalization of this note.      Ti Cordoba MD  Interventional Spine and Sports Physiatry  Physical Medicine and Rehabilitation  [facility]

## 2022-03-07 NOTE — PROGRESS NOTES
Follow up patient note  Interventional spine and Pain  Physiatry (physical medicine and  Rehabilitation)       Chief complaint:   Chief Complaint   Patient presents with   • Follow-Up     Back pain          HISTORY    Please see new patient note by Dr Cordoba,  for more details.     HPI  Patient identification: ISIDRA Holland 1975,   With Diagnoses of Cervical radiculopathy, Ulnar neuropathy of right upper extremity, History of cervical fracture, Right arm numbness, and Chronic bilateral low back pain without sciatica were pertinent to this visit.       Patient continues to have severe pain in the right side of his neck rating down the right arm with associated numbness tingling burning sensation 8 out of 10 in intensity.  He has difficulty with ADLs including upper body dressing because of his pain.    Failed conservative treatments with provider driven home exercise program including the past 2 months.     Medications tried include Tylenol, NSAIDs.  The patient did not tolerate muscle relaxers in the past.    ROS Red Flags :   Fever, Chills, Sweats: Denies  Involuntary Weight Loss: Denies  Bowel/Bladder Incontinence: Denies  Saddle Anesthesia: Denies        PMHx:   Past Medical History:   Diagnosis Date   • Back pain        PSHx:   Past Surgical History:   Procedure Laterality Date   • ANKLE TOTAL Left     twice       Family history   Family History   Problem Relation Age of Onset   • Heart Disease Father          Medications:   Outpatient Medications Marked as Taking for the 3/7/22 encounter (Office Visit) with Ti Cordoba M.D.   Medication Sig Dispense Refill   • Multiple Vitamins-Minerals (MULTIVITAMIN ADULTS PO) Take  by mouth.     • meloxicam (MOBIC) 15 MG tablet Take 1 Tablet by mouth 1 time a day as needed (pain). Do not take other NSAIDs. No refills. 60 Tablet 0        Current Outpatient Medications on File Prior to Visit   Medication Sig Dispense Refill   • Multiple Vitamins-Minerals  (MULTIVITAMIN ADULTS PO) Take  by mouth.     • meloxicam (MOBIC) 15 MG tablet Take 1 Tablet by mouth 1 time a day as needed (pain). Do not take other NSAIDs. No refills. 60 Tablet 0     No current facility-administered medications on file prior to visit.         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[employer]   Other Topics Concern   • Not on file   Social History Narrative   • Not on file     Social Determinants of Health     Financial Resource Strain: Not on file   Food Insecurity: Not on file   Transportation Needs: Not on file   Physical Activity: Not on file   Stress: Not on file   Social Connections: Not on file   Intimate Partner Violence: Not on file   Housing Stability: Not on file         EXAMINATION     Physical Exam:   Vitals: /78 (BP Location: Right arm, Patient Position: Sitting, BP Cuff Size: Adult)   Pulse 83   Temp 36 °C (96.8 °F) (Temporal)   Ht 1.829 m (6')   Wt 102 kg (224 lb 3.3 oz)   SpO2 99%     Constitutional:   Body Habitus: Body mass index is 30.41 kg/m².  Cooperation: Fully cooperates with exam  Appearance: Well-groomed no disheveled    Respiratory-  breathing comfortable on room air, no audible wheezing  Cardiovascular- capillary refills less than 2 seconds. No lower extremity edema is noted.   Psychiatric- alert and oriented ×3. Normal affect.    MSK and Neuro: -  Cervical spine  decreased active range of motion with flexion, lateral flexion,  and rotation bilaterally.   There is decreased active range of motion with cervical extension.      Palpation:   Tenderness to palpation throughout the cervical spine: negative bilaterally        Cervical spine Special tests  Neuro tension  Spurling's maneuver positive right, negative left           Key points for the international standards for neurological classification of spinal cord injury (ISNCSCI) to light touch.     Dermatome R L   C4 2 2   C5 2 2   C6 2 2   C7 1 2   C8 1 2   T1 2 2   T2 2 2                                         Motor Exam Upper Extremities   ? Myotome R L   Shoulder flexion C5 5 5   Elbow flexion C5 5 5   Wrist extension C6 5 5   Elbow extension C7 5 5   Finger flexion C8 5 5   Finger abduction T1 5 5               MEDICAL DECISION MAKING    DATA    Labs:   Lab Results   Component Value Date/Time    SODIUM 138 01/12/2022 09:14 AM    POTASSIUM 4.8 01/12/2022 09:14 AM    CHLORIDE 103 01/12/2022 09:14 AM    CO2 24 01/12/2022 09:14 AM    GLUCOSE 93 01/12/2022 09:14 AM    BUN 19 01/12/2022 09:14 AM    CREATININE 0.85 01/12/2022 09:14 AM        No results found for: PROTHROMBTM, INR     Lab Results   Component Value Date/Time    WBC 4.9 01/12/2022 09:14 AM    RBC 5.30 01/12/2022 09:14 AM    HEMOGLOBIN 16.5 01/12/2022 09:14 AM    HEMATOCRIT 47.9 01/12/2022 09:14 AM    MCV 90.4 01/12/2022 09:14 AM    MCH 31.1 01/12/2022 09:14 AM    MCHC 34.4 01/12/2022 09:14 AM    MPV 9.6 01/12/2022 09:14 AM    NEUTSPOLYS 49.70 01/12/2022 09:14 AM    LYMPHOCYTES 39.30 01/12/2022 09:14 AM    MONOCYTES 6.50 01/12/2022 09:14 AM    EOSINOPHILS 3.10 01/12/2022 09:14 AM    BASOPHILS 0.80 01/12/2022 09:14 AM        No results found for: HBA1C       Imaging:   I personally reviewed following images            I reviewed the following radiology reports                     Results for orders placed during the hospital encounter of 02/16/22    MR-CERVICAL SPINE-W/O    Impression  1.  Degenerative disease as described  above.  2.  There are combinations of uncinate joint arthropathy and right foraminal disc protrusion at C6-7 causing severe right C7 neural foraminal stenosis. The C7 nerve root might have been impinged at the neural foramina.  3.  Degenerative disease as described above.                                                                                                   Results for orders placed during the hospital encounter of 22    DX-CERVICAL SPINE-2 OR 3 VIEWS    Impression  Mild degenerative changes of the mid to lower cervical spine                         Results for orders placed during the hospital encounter of 22    DX-LUMBAR SPINE-2 OR 3 VIEWS    Impression  Degenerative changes of the lower lumbar spine                         DIAGNOSIS   Visit Diagnoses     ICD-10-CM   1. Cervical radiculopathy  M54.12   2. Ulnar neuropathy of right upper extremity  G56.21   3. History of cervical fracture  Z87.81   4. Right arm numbness  R20.0   5. Chronic bilateral low back pain without sciatica  M54.50    G89.29         ASSESSMENT and PLAN:     Avery Jhaveri  1975 male      Avery was seen today for follow-up.    Diagnoses and all orders for this visit:    Cervical radiculopathy  -     Referral to Physical Medicine Rehab    Ulnar neuropathy of right upper extremity    History of cervical fracture    Right arm numbness    Chronic bilateral low back pain without sciatica      I reviewed the MRI cervical spine with the patient at today's visit.  This is consistent with her symptoms with a cervical radiculopathy.  He is failed conservative treatments described above.    I have ordered a C7-T1 interlaminar epidural steroid injection    The risks benefits and alternatives to this procedure were discussed and the patient wishes to proceed with the procedure. Risks include but are not limited to damage to surrounding structures, infection, bleeding, worsening of pain which can be permanent, weakness which can be  permanent. Benefits include pain relief, improved function. Alternatives includes not doing the procedure.      Stop meloxicam 5 days prior to procedure above      Follow up: After the above procedure    Thank you for allowing me to participate in the care of this patient. If you have any questions please not hesitate to contact me.             Please note that this dictation was created using voice recognition software. I have made every reasonable attempt to correct obvious errors but there may be errors of grammar and content that I may have overlooked prior to finalization of this note.      Ti Cordoba MD  Interventional Spine and Sports Physiatry  Physical Medicine and Rehabilitation  [facility]

## 2022-03-08 ENCOUNTER — APPOINTMENT (OUTPATIENT)
Dept: PHYSICAL MEDICINE AND REHAB | Facility: MEDICAL CENTER | Age: 47
End: 2022-03-08
Payer: COMMERCIAL

## 2022-03-09 ENCOUNTER — TELEPHONE (OUTPATIENT)
Dept: PHYSICAL MEDICINE AND REHAB | Facility: MEDICAL CENTER | Age: 47
End: 2022-03-09
Payer: COMMERCIAL

## 2022-03-15 ENCOUNTER — APPOINTMENT (OUTPATIENT)
Dept: RADIOLOGY | Facility: REHABILITATION | Age: 47
End: 2022-03-15
Attending: PHYSICAL MEDICINE & REHABILITATION
Payer: COMMERCIAL

## 2022-03-15 ENCOUNTER — HOSPITAL ENCOUNTER (OUTPATIENT)
Facility: REHABILITATION | Age: 47
End: 2022-03-15
Attending: PHYSICAL MEDICINE & REHABILITATION | Admitting: PHYSICAL MEDICINE & REHABILITATION
Payer: COMMERCIAL

## 2022-03-15 VITALS
OXYGEN SATURATION: 98 % | RESPIRATION RATE: 16 BRPM | TEMPERATURE: 97.3 F | DIASTOLIC BLOOD PRESSURE: 112 MMHG | WEIGHT: 220.68 LBS | BODY MASS INDEX: 29.89 KG/M2 | SYSTOLIC BLOOD PRESSURE: 163 MMHG | HEART RATE: 80 BPM | HEIGHT: 72 IN

## 2022-03-15 PROCEDURE — 700117 HCHG RX CONTRAST REV CODE 255

## 2022-03-15 PROCEDURE — 62321 NJX INTERLAMINAR CRV/THRC: CPT

## 2022-03-15 PROCEDURE — 700111 HCHG RX REV CODE 636 W/ 250 OVERRIDE (IP)

## 2022-03-15 RX ORDER — DEXAMETHASONE SODIUM PHOSPHATE 10 MG/ML
INJECTION, SOLUTION INTRAMUSCULAR; INTRAVENOUS
Status: COMPLETED
Start: 2022-03-15 | End: 2022-03-15

## 2022-03-15 RX ORDER — LIDOCAINE HYDROCHLORIDE 10 MG/ML
INJECTION, SOLUTION EPIDURAL; INFILTRATION; INTRACAUDAL; PERINEURAL
Status: COMPLETED
Start: 2022-03-15 | End: 2022-03-15

## 2022-03-15 RX ADMIN — LIDOCAINE HYDROCHLORIDE 10 ML: 10 INJECTION, SOLUTION EPIDURAL; INFILTRATION; INTRACAUDAL; PERINEURAL at 10:43

## 2022-03-15 RX ADMIN — IOHEXOL 5 ML: 240 INJECTION, SOLUTION INTRATHECAL; INTRAVASCULAR; INTRAVENOUS; ORAL at 10:44

## 2022-03-15 RX ADMIN — DEXAMETHASONE SODIUM PHOSPHATE 10 MG: 10 INJECTION, SOLUTION INTRAMUSCULAR; INTRAVENOUS at 10:45

## 2022-03-15 ASSESSMENT — PAIN DESCRIPTION - PAIN TYPE
TYPE: CHRONIC PAIN
TYPE: CHRONIC PAIN

## 2022-03-15 ASSESSMENT — FIBROSIS 4 INDEX: FIB4 SCORE: .7391304347826086957

## 2022-03-15 NOTE — OP REPORT
Date of Service: 3/15/2022    Physician/s: Ti Cordoba MD    Pre-operative Diagnosis: Cervical radiculopathy    Post-operative Diagnosis: Cervical radiculopathy    Procedure: C7-T1  Cervical interlaminar epidural steroid injection    Description of procedure:    The risks, benefits, and alternatives of the procedure were reviewed and discussed with the patient.  Written informed consent was freely obtained. A pre-procedural time-out was conducted by the physician verifying patient’s identity, procedure to be performed, procedure site and side, and allergy verification. Appropriate equipment was determined to be in place for the procedure.         The patient's vital signs were carefully monitored before, throughout, and after the procedure.     In the fluoroscopy suite the patient was placed in a prone position, a pillow placed underneath their chest. The skin was prepped and draped in the usual sterile fashion. The fluoroscope was placed over the cervical neck at the appropriate injection AP angle view, and the target for injection was marked. A 25g needle was placed into the marked site, and approx 2cc of 1% Lidocaine was injected subcutaneously into the epidermal and dermal layers. The needle was removed. A 20g Tuohy needle was then placed and advanced under fluoroscopic guidance into the RIGHT C7-T1 interlaminar space at both the initial position AP view and contralateral oblique at a lateral view to ensure proper location of the needle tip at all times.  The needle was advanced with fluoroscopic guidance to the superior aspect of the T1 lamina.  Then a contralateral oblique view was used to advance the needle slightly towards the epidural space, A loss-of-resistance technique was used to guide the needle into the epidural space in a lateral fluoroscopic view and confirmed with loss of resistance with sterile normal saline. contrast dye was used to highlight the epidural space spread while the fluoroscope was  running live. Following negative aspiration, 1mL of 10mg/mL of dexamethasone followed by 2 mL of sterile saline . The needle was removed intact after restyleted. The patient's back was covered with a 4x4 gauze, the area was cleansed with sterile normal saline, and a dressing was applied. There were no complications noted.     The patient was then evaluated post-procedure, and was hemodynamically stable prior to leaving the post-operative care unit.     Follow-up as scheduled    Ti Cordoba MD  Interventional Spine and Pain  Physical Medicine and Rehabilitation  [facility]        CPT  interlaminar cervical/thoracic epidural: 29825

## 2022-03-15 NOTE — PROGRESS NOTES
Pt admitted to pre-procedure room. Site and procedure confirmed. Pt history, medications and allergies reviewed. Designated  waiting in car. Pre-procedure assessment complete. VSS. Consents signed, DC instructions reviewed, all questions answered. Pertinent medical history (Healthy) reviewed in Epic and communicated to procedure RN.  Dr. Cordoba in to see patient. Orders noted.

## 2022-03-15 NOTE — PROGRESS NOTES
Pt arrived from procedure room, report received from procedure RN. VSS, tolerating liquids with no nausea, pain assessed. Dressing CDI. Dr. Cordoba in to see patient. Pt ambulatory and meets DC criteria    Pt DC'd to designated

## 2022-03-15 NOTE — INTERVAL H&P NOTE
H&P reviewed. The patient was examined and there are no changes to the H&P     Lungs clear to auscultation bilaterally.  No abdominal tenderness.  Heart regular rate and rhythm.  Vitals reviewed.    Proceed with the originally scheduled procedure.  The risks, benefits and alternatives were discussed.  The patient understands these.    Pre-Op Diagnosis Codes:     * Cervical radiculopathy [M54.12]  Procedure(s):  C7-T1 interlaminar epidural steroid injection  .    Ti Cordoba MD  Physical Medicine and Rehabilitation  Interventional Spine and Sports Physiatry  [facility]

## 2022-03-16 ENCOUNTER — TELEPHONE (OUTPATIENT)
Dept: PHYSICAL MEDICINE AND REHAB | Facility: MEDICAL CENTER | Age: 47
End: 2022-03-16
Payer: COMMERCIAL

## 2022-03-16 NOTE — TELEPHONE ENCOUNTER
Spoke to Pt in regards to his SP that was done with Dr. Cordoba dated 3/15/22 for his C7-T1 interlaminar epidural steroid injection and he stated that it is awesome.

## 2022-04-11 ENCOUNTER — OFFICE VISIT (OUTPATIENT)
Dept: PHYSICAL MEDICINE AND REHAB | Facility: MEDICAL CENTER | Age: 47
End: 2022-04-11
Payer: COMMERCIAL

## 2022-04-11 VITALS
SYSTOLIC BLOOD PRESSURE: 140 MMHG | DIASTOLIC BLOOD PRESSURE: 86 MMHG | HEIGHT: 72 IN | WEIGHT: 229.28 LBS | HEART RATE: 104 BPM | BODY MASS INDEX: 31.05 KG/M2 | TEMPERATURE: 96.7 F | OXYGEN SATURATION: 96 %

## 2022-04-11 DIAGNOSIS — G89.29 CHRONIC BILATERAL LOW BACK PAIN, UNSPECIFIED WHETHER SCIATICA PRESENT: ICD-10-CM

## 2022-04-11 DIAGNOSIS — M25.551 CHRONIC PAIN OF BOTH HIPS: ICD-10-CM

## 2022-04-11 DIAGNOSIS — M54.12 CERVICAL RADICULOPATHY: ICD-10-CM

## 2022-04-11 DIAGNOSIS — M25.552 CHRONIC PAIN OF BOTH HIPS: ICD-10-CM

## 2022-04-11 DIAGNOSIS — M54.50 CHRONIC BILATERAL LOW BACK PAIN, UNSPECIFIED WHETHER SCIATICA PRESENT: ICD-10-CM

## 2022-04-11 DIAGNOSIS — G89.29 CHRONIC PAIN OF BOTH HIPS: ICD-10-CM

## 2022-04-11 DIAGNOSIS — Z87.81 HISTORY OF CERVICAL FRACTURE: ICD-10-CM

## 2022-04-11 PROCEDURE — 99214 OFFICE O/P EST MOD 30 MIN: CPT | Performed by: PHYSICAL MEDICINE & REHABILITATION

## 2022-04-11 ASSESSMENT — PATIENT HEALTH QUESTIONNAIRE - PHQ9
5. POOR APPETITE OR OVEREATING: 1 - SEVERAL DAYS
SUM OF ALL RESPONSES TO PHQ QUESTIONS 1-9: 10
CLINICAL INTERPRETATION OF PHQ2 SCORE: 1

## 2022-04-11 ASSESSMENT — FIBROSIS 4 INDEX: FIB4 SCORE: .7391304347826086957

## 2022-04-11 ASSESSMENT — PAIN SCALES - GENERAL: PAINLEVEL: 9=SEVERE PAIN

## 2022-04-11 NOTE — PROGRESS NOTES
Follow up patient note  Interventional spine and Pain  Physiatry (physical medicine and  Rehabilitation)       Chief complaint:   Chief Complaint   Patient presents with   • Follow-Up     Neck pain          HISTORY    Please see new patient note by Dr Cordoba,  for more details.     HPI  Patient identification: Avery Jhaveri ,  1975,   With Diagnoses of Chronic bilateral low back pain, unspecified whether sciatica present, Chronic pain of both hips, right worse than left, Cervical radiculopathy, and History of cervical fracture were pertinent to this visit.     Procedures:  3/15/2022 cervical interlaminar epidural steroid injection 90% improved    The patient's neck pain improved significantly and he no longer has pain radiating down the arm.  He is very happy with the results after the cervical epidural steroid injection.    Majority patient's pain now is bilaterally in his low back as well as with his hips the right side is typically worse than the left 6 out of 10 in intensity constant worse with bending twisting lifting.  Causing functional deficits.      Failed conservative treatments with provider driven home exercise program including the past 2 months.     Medications tried include Tylenol, NSAIDs.  The patient did not tolerate muscle relaxers in the past.    ROS Red Flags :   Fever, Chills, Sweats: Denies  Involuntary Weight Loss: Denies  Bowel/Bladder Incontinence: Denies  Saddle Anesthesia: Denies        PMHx:   Past Medical History:   Diagnosis Date   • Back pain        PSHx:   Past Surgical History:   Procedure Laterality Date   • BLOCK EPIDURAL STEROID INJECTION N/A 3/15/2022    Procedure: C7-T1 interlaminar epidural steroid injection;  Surgeon: Ti Cordoba M.D.;  [facility];  Service: Pain Management   • CONSCIOUS SEDATION N/A 3/15/2022    Procedure: .;  Surgeon: Ti Cordoba M.D.;  Location: [facility];  Service: Pain Management   • ANKLE TOTAL  Left 1995    twice       Family history   Family History   Problem Relation Age of Onset   • Heart Disease Father          Medications:   Outpatient Medications Marked as Taking for the 4/11/22 encounter (Office Visit) with Ti Cordoba M.D.   Medication Sig Dispense Refill   • Multiple Vitamins-Minerals (MULTIVITAMIN ADULTS PO) Take  by mouth.     • meloxicam (MOBIC) 15 MG tablet Take 1 Tablet by mouth 1 time a day as needed (pain). Do not take other NSAIDs. No refills. 60 Tablet 0        Current Outpatient Medications on File Prior to Visit   Medication Sig Dispense Refill   • Multiple Vitamins-Minerals (MULTIVITAMIN ADULTS PO) Take  by mouth.     • meloxicam (MOBIC) 15 MG tablet Take 1 Tablet by mouth 1 time a day as needed (pain). Do not take other NSAIDs. No refills. 60 Tablet 0     No current facility-administered medications on file prior to visit.         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[employer]   Other Topics Concern   • Not on file   Social History Narrative   • Not on file     Social Determinants of Health     Financial Resource Strain: Not on file   Food Insecurity: Not on file   Transportation Needs: Not on file   Physical Activity: Not on file   Stress: Not on file   Social Connections: Not on file   Intimate Partner Violence: Not on file   Housing Stability: Not on file         EXAMINATION     Physical Exam:   Vitals: /86 (BP Location: Right arm,  Patient Position: Sitting, BP Cuff Size: Adult)   Pulse (!) 104   Temp 35.9 °C (96.7 °F) (Temporal)   Ht 1.829 m (6')   Wt 104 kg (229 lb 4.5 oz)   SpO2 96%     Constitutional:   Body Habitus: Body mass index is 31.1 kg/m².  Cooperation: Fully cooperates with exam  Appearance: Well-groomed no disheveled    Respiratory-  breathing comfortable on room air, no audible wheezing  Cardiovascular- capillary refills less than 2 seconds. No lower extremity edema is noted.   Psychiatric- alert and oriented ×3. Normal affect.    MSK and Neuro: -    Cervical spine  There are no signs of infection around the injection sites.     full  active range of motion with flexion, lateral flexion, and rotation bilaterally.   There is full  active range of motion with cervical extension.      Palpation:   Tenderness to palpation throughout the cervical spine: negative bilaterally        Cervical spine Special tests  Neuro tension  Spurling's maneuver negative bilaterally           Key points for the international standards for neurological classification of spinal cord injury (ISNCSCI) to light touch.     Dermatome R L   C4 2 2   C5 2 2   C6 2 2   C7 2 2   C8 2 2   T1 2 2   T2 2 2                                         Motor Exam Upper Extremities   ? Myotome R L   Shoulder flexion C5 5 5   Elbow flexion C5 5 5   Wrist extension C6 5 5   Elbow extension C7 5 5   Finger flexion C8 5 5   Finger abduction T1 5 5         Thoracic/Lumbar Spine/Sacral Spine/Hips   There are no signs of infection around the injection sites.   decreased active range of motion with flexion, lateral flexion, and rotation bilaterally.   There is decreased active range of motion with lumbar extension.    There is  pain with lumbar extension.   There is pain with facet loading maneuver (extension rotation) with axial low back pain on the BILATERAL side(s)    Palpation:   No tenderness to palpation in midline at T1-T12 levels. No tenderness to palpation in the  left and right of the midline T1-L5, NEGATIVE for tenderness to palpation to the para-midline region in the lower lumbar levels.  palpation over SI joint: negative bilaterally    palpation in hip or over the gluteus medius tendon insertion: negative bilaterally      Lumbar spine Special tests  Neuro tension  Straight leg test positive right, negative left    Slump test positive right, negative left      Key points for the international standards for neurological classification of spinal cord injury (ISNCSCI) to light touch.     Dermatome R L                                      L2 2 2   L3 2 2   L4 2 2   L5 2 2   S1 2 2   S2 2 2         Motor Exam Lower Extremities    ? Myotome R L   Hip flexion L2 5 5   Knee extension L3 5 5   Ankle dorsiflexion L4 5 5   Toe extension L5 5 5   Ankle plantarflexion S1 5 5             MEDICAL DECISION MAKING    DATA    Labs:   Lab Results   Component Value Date/Time    SODIUM 138 01/12/2022 09:14 AM    POTASSIUM 4.8 01/12/2022 09:14 AM    CHLORIDE 103 01/12/2022 09:14 AM    CO2 24 01/12/2022 09:14 AM    GLUCOSE 93 01/12/2022 09:14 AM    BUN 19 01/12/2022 09:14 AM    CREATININE 0.85 01/12/2022 09:14 AM        No results found for: PROTHROMBTM, INR     Lab Results   Component Value Date/Time    WBC 4.9 01/12/2022 09:14 AM    RBC 5.30 01/12/2022 09:14 AM    HEMOGLOBIN 16.5 01/12/2022 09:14 AM    HEMATOCRIT 47.9 01/12/2022 09:14 AM    MCV 90.4 01/12/2022 09:14 AM    MCH 31.1 01/12/2022 09:14 AM    MCHC 34.4 01/12/2022 09:14 AM    MPV 9.6 01/12/2022 09:14 AM    NEUTSPOLYS 49.70 01/12/2022 09:14 AM    LYMPHOCYTES 39.30 01/12/2022 09:14 AM    MONOCYTES 6.50 01/12/2022 09:14 AM    EOSINOPHILS 3.10 01/12/2022 09:14 AM    BASOPHILS 0.80 01/12/2022 09:14 AM        No results found for: HBA1C       Imaging:   I personally reviewed following images            I reviewed the following radiology reports                     Results for orders placed during the hospital encounter of  22    MR-CERVICAL SPINE-W/O    Impression  1.  Degenerative disease as described above.  2.  There are combinations of uncinate joint arthropathy and right foraminal disc protrusion at C6-7 causing severe right C7 neural foraminal stenosis. The C7 nerve root might have been impinged at the neural foramina.  3.  Degenerative disease as described above.                                                                                                   Results for orders placed during the hospital encounter of 22    DX-CERVICAL SPINE-2 OR 3 VIEWS    Impression  Mild degenerative changes of the mid to lower cervical spine                         Results for orders placed during the hospital encounter of 22    DX-LUMBAR SPINE-2 OR 3 VIEWS    Impression  Degenerative changes of the lower lumbar spine                         DIAGNOSIS   Visit Diagnoses     ICD-10-CM   1. Chronic bilateral low back pain, unspecified whether sciatica present  M54.50    G89.29   2. Chronic pain of both hips, right worse than left  M25.551    M25.552    G89.29   3. Cervical radiculopathy  M54.12   4. History of cervical fracture  Z87.81         ASSESSMENT and PLAN:     Avery Jhaveri  1975 male      Avery was seen today for follow-up.    Diagnoses and all orders for this visit:    Chronic bilateral low back pain, unspecified whether sciatica present  Comments:  Not controlled, failed conservative treatments described above.  Orders:  -     MR-LUMBAR SPINE-W/O; Future    Chronic pain of both hips, right worse than left  Comments:  Not controlled.  Continue with diagnostic work-up  Orders:  -     DX-HIP-BILATERAL-WITH PELVIS-3/4 VIEWS; Future    Cervical radiculopathy  Comments:  Stable, significantly improved after cervical epidural steroid injection    History of cervical fracture         I would consider the patient for an epidural steroid injection versus hip injection depending on the results of the above imaging.    Continue  meloxicam 15 mg daily as needed      Follow up: After the above imaging studies    Thank you for allowing me to participate in the care of this patient. If you have any questions please not hesitate to contact me.             Please note that this dictation was created using voice recognition software. I have made every reasonable attempt to correct obvious errors but there may be errors of grammar and content that I may have overlooked prior to finalization of this note.      Ti Cordoba MD  Interventional Spine and Sports Physiatry  Physical Medicine and Rehabilitation  [facility]